# Patient Record
Sex: FEMALE | URBAN - METROPOLITAN AREA
[De-identification: names, ages, dates, MRNs, and addresses within clinical notes are randomized per-mention and may not be internally consistent; named-entity substitution may affect disease eponyms.]

---

## 2024-07-03 ENCOUNTER — APPOINTMENT (OUTPATIENT)
Dept: PRIMARY CARE | Facility: CLINIC | Age: 24
End: 2024-07-03
Payer: COMMERCIAL

## 2024-07-03 VITALS
HEART RATE: 87 BPM | OXYGEN SATURATION: 99 % | BODY MASS INDEX: 30.69 KG/M2 | HEIGHT: 69 IN | SYSTOLIC BLOOD PRESSURE: 122 MMHG | DIASTOLIC BLOOD PRESSURE: 84 MMHG | WEIGHT: 207.2 LBS

## 2024-07-03 DIAGNOSIS — Z00.00 ROUTINE PHYSICAL EXAMINATION: Primary | ICD-10-CM

## 2024-07-03 DIAGNOSIS — E55.9 VITAMIN D DEFICIENCY: ICD-10-CM

## 2024-07-03 LAB
25(OH)D3 SERPL-MCNC: 15 NG/ML (ref 30–100)
ALBUMIN SERPL BCP-MCNC: 4.1 G/DL (ref 3.4–5)
ALP SERPL-CCNC: 53 U/L (ref 33–110)
ALT SERPL W P-5'-P-CCNC: 21 U/L (ref 7–45)
ANION GAP SERPL CALC-SCNC: 14 MMOL/L (ref 10–20)
AST SERPL W P-5'-P-CCNC: 17 U/L (ref 9–39)
BASOPHILS # BLD AUTO: 0.07 X10*3/UL (ref 0–0.1)
BASOPHILS NFR BLD AUTO: 1.3 %
BILIRUB SERPL-MCNC: 0.3 MG/DL (ref 0–1.2)
BUN SERPL-MCNC: 13 MG/DL (ref 6–23)
CALCIUM SERPL-MCNC: 10.1 MG/DL (ref 8.6–10.6)
CHLORIDE SERPL-SCNC: 104 MMOL/L (ref 98–107)
CHOLEST SERPL-MCNC: 212 MG/DL (ref 0–199)
CHOLESTEROL/HDL RATIO: 2.9
CO2 SERPL-SCNC: 23 MMOL/L (ref 21–32)
CREAT SERPL-MCNC: 0.7 MG/DL (ref 0.5–1.05)
EGFRCR SERPLBLD CKD-EPI 2021: >90 ML/MIN/1.73M*2
EOSINOPHIL # BLD AUTO: 0.15 X10*3/UL (ref 0–0.7)
EOSINOPHIL NFR BLD AUTO: 2.9 %
ERYTHROCYTE [DISTWIDTH] IN BLOOD BY AUTOMATED COUNT: 13.2 % (ref 11.5–14.5)
GLUCOSE SERPL-MCNC: 108 MG/DL (ref 74–99)
HCT VFR BLD AUTO: 44 % (ref 36–46)
HDLC SERPL-MCNC: 72.5 MG/DL
HGB BLD-MCNC: 13.9 G/DL (ref 12–16)
IMM GRANULOCYTES # BLD AUTO: 0.01 X10*3/UL (ref 0–0.7)
IMM GRANULOCYTES NFR BLD AUTO: 0.2 % (ref 0–0.9)
LDLC SERPL CALC-MCNC: 108 MG/DL
LYMPHOCYTES # BLD AUTO: 2.86 X10*3/UL (ref 1.2–4.8)
LYMPHOCYTES NFR BLD AUTO: 54.4 %
MCH RBC QN AUTO: 26.9 PG (ref 26–34)
MCHC RBC AUTO-ENTMCNC: 31.6 G/DL (ref 32–36)
MCV RBC AUTO: 85 FL (ref 80–100)
MONOCYTES # BLD AUTO: 0.37 X10*3/UL (ref 0.1–1)
MONOCYTES NFR BLD AUTO: 7 %
NEUTROPHILS # BLD AUTO: 1.8 X10*3/UL (ref 1.2–7.7)
NEUTROPHILS NFR BLD AUTO: 34.2 %
NON HDL CHOLESTEROL: 140 MG/DL (ref 0–149)
NRBC BLD-RTO: 0 /100 WBCS (ref 0–0)
PLATELET # BLD AUTO: 396 X10*3/UL (ref 150–450)
POTASSIUM SERPL-SCNC: 4.4 MMOL/L (ref 3.5–5.3)
PROT SERPL-MCNC: 7.3 G/DL (ref 6.4–8.2)
RBC # BLD AUTO: 5.16 X10*6/UL (ref 4–5.2)
SODIUM SERPL-SCNC: 137 MMOL/L (ref 136–145)
TRIGL SERPL-MCNC: 156 MG/DL (ref 0–149)
TSH SERPL-ACNC: 1.36 MIU/L (ref 0.44–3.98)
VLDL: 31 MG/DL (ref 0–40)
WBC # BLD AUTO: 5.3 X10*3/UL (ref 4.4–11.3)

## 2024-07-03 PROCEDURE — 80053 COMPREHEN METABOLIC PANEL: CPT

## 2024-07-03 PROCEDURE — 80061 LIPID PANEL: CPT

## 2024-07-03 PROCEDURE — 84443 ASSAY THYROID STIM HORMONE: CPT

## 2024-07-03 PROCEDURE — 82306 VITAMIN D 25 HYDROXY: CPT

## 2024-07-03 PROCEDURE — 36415 COLL VENOUS BLD VENIPUNCTURE: CPT

## 2024-07-03 PROCEDURE — 99385 PREV VISIT NEW AGE 18-39: CPT | Performed by: FAMILY MEDICINE

## 2024-07-03 PROCEDURE — 85025 COMPLETE CBC W/AUTO DIFF WBC: CPT

## 2024-07-03 PROCEDURE — 1036F TOBACCO NON-USER: CPT | Performed by: FAMILY MEDICINE

## 2024-07-03 RX ORDER — DROSPIRENONE AND ETHINYL ESTRADIOL 0.03MG-3MG
1 KIT ORAL DAILY
COMMUNITY
Start: 2024-06-03

## 2024-07-03 ASSESSMENT — PATIENT HEALTH QUESTIONNAIRE - PHQ9
SUM OF ALL RESPONSES TO PHQ9 QUESTIONS 1 AND 2: 0
7. TROUBLE CONCENTRATING ON THINGS, SUCH AS READING THE NEWSPAPER OR WATCHING TELEVISION: NOT AT ALL
9. THOUGHTS THAT YOU WOULD BE BETTER OFF DEAD, OR OF HURTING YOURSELF: NOT AT ALL
SUM OF ALL RESPONSES TO PHQ QUESTIONS 1-9: 0
8. MOVING OR SPEAKING SO SLOWLY THAT OTHER PEOPLE COULD HAVE NOTICED. OR THE OPPOSITE, BEING SO FIGETY OR RESTLESS THAT YOU HAVE BEEN MOVING AROUND A LOT MORE THAN USUAL: NOT AT ALL
4. FEELING TIRED OR HAVING LITTLE ENERGY: NOT AT ALL
5. POOR APPETITE OR OVEREATING: NOT AT ALL
6. FEELING BAD ABOUT YOURSELF - OR THAT YOU ARE A FAILURE OR HAVE LET YOURSELF OR YOUR FAMILY DOWN: NOT AT ALL
2. FEELING DOWN, DEPRESSED OR HOPELESS: NOT AT ALL
1. LITTLE INTEREST OR PLEASURE IN DOING THINGS: NOT AT ALL
10. IF YOU CHECKED OFF ANY PROBLEMS, HOW DIFFICULT HAVE THESE PROBLEMS MADE IT FOR YOU TO DO YOUR WORK, TAKE CARE OF THINGS AT HOME, OR GET ALONG WITH OTHER PEOPLE: NOT DIFFICULT AT ALL
3. TROUBLE FALLING OR STAYING ASLEEP OR SLEEPING TOO MUCH: NOT AT ALL

## 2024-07-03 NOTE — PROGRESS NOTES
Patient ID: Ammy Spain [id number] is a 24 y.o. female who presents for Establish Care (New patient).      Pt is here to establish care and Physical  Diet - Trying to watch her Diet  Exercise - Cardio 3 times a week  Medication - On OCP for 6 years    Now having regular periods.  LMP few weeks ago   Supplements - none  Denies smoking/ alcohol or drug use.   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       Immunization History   Administered Date(s) Administered    DTaP, Unspecified 2001, 2001, 2001, 12/12/2002, 06/06/2006    Flu vaccine (IIV4), preservative free *Check age/dose* 10/14/2015, 09/29/2017, 10/22/2018, 10/03/2020, 11/24/2023    HPV, Quadrivalent 08/12/2014, 10/15/2014, 02/23/2015    Hep A, Unspecified 06/06/2006, 06/15/2007    Hepatitis B vaccine, 19 yrs and under (RECOMBIVAX, ENGERIX) 2001, 2001, 2001    HiB, unspecified 2001, 2001, 2001, 10/07/2002    Influenza Whole 11/28/2007    Influenza, Unspecified 11/04/2013    Influenza, live, intranasal 10/22/2008, 11/04/2013    Influenza, live, intranasal, quadrivalent 11/05/2014    Influenza, seasonal, injectable 01/05/2013    MMR vaccine, subcutaneous (MMR II) 06/13/2002, 07/02/2005    Meningococcal ACWY-D (Menactra) 4-valent conjugate vaccine 06/15/2012, 08/07/2017    Meningococcal B vaccine (BEXSERO) 08/20/2018, 09/26/2018    Novel Influenza-H1N1-09, nasal 11/19/2009, 01/26/2010    Pfizer Purple Cap SARS-CoV-2 05/03/2021, 05/24/2021    Pneumococcal Conjugate PCV 7 2001    Pneumococcal Conjugate, Unspecified 2001, 2001, 03/11/2002, 10/07/2002    Polio, Unspecified 2001, 2001, 12/12/2002, 06/06/2006    Tdap vaccine, age 7 year and older (BOOSTRIX, ADACEL) 06/15/2012, 06/17/2024    Varicella vaccine, subcutaneous (VARIVAX) 06/13/2002, 12/21/2007       No Known  "Allergies    Current Outpatient Medications   Medication Sig Dispense Refill    drospirenone-ethinyl estradioL (Amarilys, Ocella) 3-0.03 mg tablet Take 1 tablet by mouth once daily.       No current facility-administered medications for this visit.       History reviewed. No pertinent past medical history.  Social History     Tobacco Use    Smoking status: Never    Smokeless tobacco: Never   Vaping Use    Vaping status: Never Used   Substance Use Topics    Alcohol use: Never    Drug use: Never     Family History   Problem Relation Name Age of Onset    Arthritis Father Mat Frazier     Diabetes Father Mat Frazier        /84   Pulse 87   Ht 1.753 m (5' 9\")   Wt 94 kg (207 lb 3.2 oz)   SpO2 99%   BMI 30.60 kg/m²   Review of System  Constitutional:  Negative for appetite change, chills, fatigue, fever and unexpected weight change.   HENT:  Negative for hearing loss and voice change.    Eyes:  Negative for visual disturbance.   Respiratory:  Negative for cough, chest tightness, shortness of breath and wheezing.    Cardiovascular:  Negative for chest pain and leg swelling.   Gastrointestinal:  Negative for abdominal pain, blood in stool, constipation and diarrhea.   Genitourinary:  Negative for difficulty urinating and dysuria.   Musculoskeletal:  Negative for arthralgias and joint swelling.   Skin:  Negative for rash.   Neurological:  Negative for dizziness, weakness, numbness and headaches.   Psychiatric/Behavioral:  Negative for behavioral problems and confusion.      /84   Pulse 87   Ht 1.753 m (5' 9\")   Wt 94 kg (207 lb 3.2 oz)   SpO2 99%   BMI 30.60 kg/m²     General Appearance:    Alert, cooperative, no distress, appears stated age   Head:    Normocephalic, without obvious abnormality, atraumatic   Eyes:    PERRL, conjunctiva/corneas clear, EOM's intact,    Neck:   Supple, symmetrical, No enlargement   Back:     Symmetric, no curvature, ROM normal, no CVA tenderness   Lungs:     Clear " to auscultation bilaterally, respirations normal ,no wheezing or ronchi   Chest Wall:    No tenderness or deformity   Abdomen:     Soft, non-tender, bowel sounds active all four quadrants, no masses,   no organomegaly   Extremities:   Extremities normal, atraumatic, no cyanosis or edema   Pulses:   2+ and symmetric all extremities   Skin:   Skin color, texture, turgor normal, no rashes or lesions   Lymph nodes:   Cervical nodes normal   Neurologic:    normal strength, sensation and reflexes.      No visits with results within 3 Month(s) from this visit.   Latest known visit with results is:   No results found for any previous visit.       Assessment/Plan   Diagnoses and all orders for this visit:  Routine physical examination  -     TSH with reflex to Free T4 if abnormal; Future  -     Lipid Panel; Future  -     Comprehensive Metabolic Panel; Future  -     CBC and Auto Differential; Future  Vitamin D deficiency  -     Vitamin D 25-Hydroxy,Total (for eval of Vitamin D levels); Future    Patient was advised to follow healthy diet and do continue daily exercise  Will get complete blood work  Patient up-to-date with flu and Tdap

## 2024-07-08 ENCOUNTER — TELEPHONE (OUTPATIENT)
Dept: PRIMARY CARE | Facility: CLINIC | Age: 24
End: 2024-07-08
Payer: COMMERCIAL

## 2024-07-08 DIAGNOSIS — E55.9 VITAMIN D DEFICIENCY: ICD-10-CM

## 2024-07-08 NOTE — TELEPHONE ENCOUNTER
Pt called to put their pharmacy on file, and also stated they needed an rx for vitamin D, thank you.

## 2024-07-08 NOTE — TELEPHONE ENCOUNTER
----- Message from Dora Tsai MD sent at 7/5/2024  4:20 PM EDT -----  Low Vitamin D - Pt should take Ergocalciferol 13983 [employer] Unit once a week for 8 weeks.  Followed By Vitamin D3 2000 IU  Daily.

## 2024-07-09 DIAGNOSIS — E55.9 VITAMIN D DEFICIENCY: ICD-10-CM

## 2024-07-09 RX ORDER — ERGOCALCIFEROL 1.25 MG/1
50000 CAPSULE ORAL
Qty: 8 CAPSULE | Refills: 0 | Status: SHIPPED | OUTPATIENT
Start: 2024-07-14 | End: 2024-09-08

## 2024-07-17 RX ORDER — ERGOCALCIFEROL 1.25 MG/1
50000 CAPSULE ORAL
Qty: 8 CAPSULE | Refills: 0 | Status: SHIPPED | OUTPATIENT
Start: 2024-07-21 | End: 2024-09-15

## 2024-10-24 ENCOUNTER — APPOINTMENT (OUTPATIENT)
Dept: DERMATOLOGY | Facility: CLINIC | Age: 24
End: 2024-10-24
Payer: COMMERCIAL

## 2024-10-28 ENCOUNTER — TELEPHONE (OUTPATIENT)
Dept: PRIMARY CARE | Facility: CLINIC | Age: 24
End: 2024-10-28
Payer: COMMERCIAL

## 2024-10-28 DIAGNOSIS — E55.9 VITAMIN D DEFICIENCY: Primary | ICD-10-CM

## 2024-10-30 RX ORDER — ERGOCALCIFEROL 1.25 MG/1
50000 CAPSULE ORAL WEEKLY
Qty: 8 CAPSULE | Refills: 0 | Status: SHIPPED | OUTPATIENT
Start: 2024-10-30 | End: 2024-12-25

## 2024-11-05 ENCOUNTER — TELEPHONE (OUTPATIENT)
Dept: PRIMARY CARE | Facility: CLINIC | Age: 24
End: 2024-11-05
Payer: COMMERCIAL

## 2024-11-05 DIAGNOSIS — E55.9 VITAMIN D DEFICIENCY: ICD-10-CM

## 2024-11-06 RX ORDER — ERGOCALCIFEROL 1.25 MG/1
50000 CAPSULE ORAL WEEKLY
Qty: 8 CAPSULE | Refills: 0 | Status: SHIPPED | OUTPATIENT
Start: 2024-11-06 | End: 2025-01-01

## 2024-12-19 ENCOUNTER — TELEPHONE (OUTPATIENT)
Dept: DERMATOLOGY | Facility: CLINIC | Age: 24
End: 2024-12-19

## 2024-12-19 ENCOUNTER — TELEMEDICINE (OUTPATIENT)
Dept: DERMATOLOGY | Facility: CLINIC | Age: 24
End: 2024-12-19
Payer: COMMERCIAL

## 2024-12-19 DIAGNOSIS — L70.0 ACNE VULGARIS: Primary | ICD-10-CM

## 2024-12-19 PROCEDURE — 99214 OFFICE O/P EST MOD 30 MIN: CPT | Performed by: DERMATOLOGY

## 2024-12-19 RX ORDER — BENZOYL PEROXIDE 50 MG/ML
1 LIQUID TOPICAL DAILY
Qty: 227 G | Refills: 11 | Status: SHIPPED | OUTPATIENT
Start: 2024-12-19 | End: 2025-12-19

## 2024-12-19 RX ORDER — CLINDAMYCIN PHOSPHATE 10 UG/ML
LOTION TOPICAL DAILY
Qty: 60 ML | Refills: 11 | Status: SHIPPED | OUTPATIENT
Start: 2024-12-19 | End: 2025-12-19

## 2024-12-19 RX ORDER — SPIRONOLACTONE 25 MG/1
TABLET ORAL
Qty: 360 TABLET | Refills: 1 | Status: SHIPPED | OUTPATIENT
Start: 2024-12-19

## 2024-12-19 RX ORDER — TRETINOIN 1 MG/G
CREAM TOPICAL
Qty: 20 G | Refills: 11 | Status: SHIPPED | OUTPATIENT
Start: 2024-12-19

## 2024-12-19 ASSESSMENT — DERMATOLOGY QUALITY OF LIFE (QOL) ASSESSMENT
RATE HOW BOTHERED YOU ARE BY EFFECTS OF YOUR SKIN PROBLEMS ON YOUR ACTIVITIES (EG, GOING OUT, ACCOMPLISHING WHAT YOU WANT, WORK ACTIVITIES OR YOUR RELATIONSHIPS WITH OTHERS): 0 - NEVER BOTHERED
RATE HOW EMOTIONALLY BOTHERED YOU ARE BY YOUR SKIN PROBLEM (FOR EXAMPLE, WORRY, EMBARRASSMENT, FRUSTRATION): 4
RATE HOW BOTHERED YOU ARE BY SYMPTOMS OF YOUR SKIN PROBLEM (EG, ITCHING, STINGING BURNING, HURTING OR SKIN IRRITATION): 0 - NEVER BOTHERED
RATE HOW EMOTIONALLY BOTHERED YOU ARE BY YOUR SKIN PROBLEM (FOR EXAMPLE, WORRY, EMBARRASSMENT, FRUSTRATION): 4
WHAT SINGLE SKIN CONDITION LISTED BELOW IS THE PATIENT ANSWERING THE QUALITY-OF-LIFE ASSESSMENT QUESTIONS ABOUT: ACNE
RATE HOW BOTHERED YOU ARE BY SYMPTOMS OF YOUR SKIN PROBLEM (EG, ITCHING, STINGING BURNING, HURTING OR SKIN IRRITATION): 0 - NEVER BOTHERED
WHAT SINGLE SKIN CONDITION LISTED BELOW IS THE PATIENT ANSWERING THE QUALITY-OF-LIFE ASSESSMENT QUESTIONS ABOUT: ACNE
RATE HOW BOTHERED YOU ARE BY EFFECTS OF YOUR SKIN PROBLEMS ON YOUR ACTIVITIES (EG, GOING OUT, ACCOMPLISHING WHAT YOU WANT, WORK ACTIVITIES OR YOUR RELATIONSHIPS WITH OTHERS): 0 - NEVER BOTHERED

## 2024-12-19 NOTE — PROGRESS NOTES
"Subjective     Ammy Frazier is a 24 y.o. female who presents for the following: Acne.     Here for acne follow up.  Last seen by Dr. Rinaldi on 6-7-2023.  At that time she was well controlled on clindamycin lotion, tretinoin 0.1%, and BPO 4% wash and was continued on these.    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  Notices an association with her period and when she gets breakouts.    Review of Systems:  No other skin or systemic complaints other than what is documented elsewhere in the note.    The following portions of the chart were reviewed this encounter and updated as appropriate:          Skin Cancer History  No skin cancer on file.      Specialty Problems    None       Objective   Well appearing patient in no apparent distress; mood and affect are within normal limits.    A focused skin examination was performed. All findings within normal limits unless otherwise noted below.    Assessment/Plan   1. Acne vulgaris  Head - Anterior (Face)  Scattered comedones and inflammatory papulopustules.    - CONTINUE BPO 5% wash  - CONTINUE clindamycin 1% lotion  - CONTINUE tretinoin 0.1%  - START spironolactone 100mg (50 mg x 2 weeks, then 75mg x 2 weeks); she was on this a few years ago and had a good response to this dose    spironolactone (Aldactone) 25 mg tablet - Head - Anterior (Face)  Take 50 mg (2 tabs) for 2 weeks, then 75 mg (3 tabs) for 2 weeks, then full dose of 100 mg (4 tabs).    clindamycin (Cleocin T) 1 % lotion - Head - Anterior (Face)  Apply topically once daily. 60g    tretinoin (Retin-A) 0.1 % cream - Head - Anterior (Face)  Apply a thin layer to affected area at bedtime as tolerated.    benzoyl peroxide 5 % external wash - Head - Anterior (Face)  Apply 1 Application topically once daily. Dispense 240 mL    Related Procedures  Follow Up In Dermatology - Established Patient    Follow up in 3 months    Augustine Ghosh MD  PGY-2, Dermatology    I saw " and evaluated the patient. I personally obtained the key and critical portions of the history and physical exam or was physically present for key and critical portions performed by the resident/fellow. I reviewed the resident/fellow's documentation and discussed the patient with the resident/fellow. I agree with the resident/fellow's medical decision making as documented in the note.    Kyle Guzman MD PhD

## 2024-12-26 ENCOUNTER — TELEPHONE (OUTPATIENT)
Dept: DERMATOLOGY | Facility: CLINIC | Age: 24
End: 2024-12-26
Payer: COMMERCIAL

## 2024-12-26 NOTE — TELEPHONE ENCOUNTER
Austin Gimenez,    We have scheduled your next virtual appointment with Dr. Guzman for 3/20/2025 at 4pm.  If this date and time are okay, you don't need to do anything.  If the appointment is inconvenient, please contact the office at [phone].    Sincerely,    Patient Access Team   Dept of Dermatology

## 2025-01-09 DIAGNOSIS — E55.9 VITAMIN D DEFICIENCY: ICD-10-CM

## 2025-01-09 RX ORDER — ACETAMINOPHEN 500 MG
2000 TABLET ORAL DAILY
Qty: 90 CAPSULE | Refills: 1 | Status: SHIPPED | OUTPATIENT
Start: 2025-01-09

## 2025-01-14 ENCOUNTER — TELEPHONE (OUTPATIENT)
Dept: PRIMARY CARE | Facility: CLINIC | Age: 25
End: 2025-01-14
Payer: COMMERCIAL

## 2025-01-14 DIAGNOSIS — E55.9 VITAMIN D DEFICIENCY: ICD-10-CM

## 2025-01-14 RX ORDER — ACETAMINOPHEN 500 MG
2000 TABLET ORAL DAILY
Qty: 90 CAPSULE | Refills: 1 | Status: SHIPPED | OUTPATIENT
Start: 2025-01-14

## 2025-03-20 ENCOUNTER — APPOINTMENT (OUTPATIENT)
Dept: DERMATOLOGY | Facility: CLINIC | Age: 25
End: 2025-03-20
Payer: COMMERCIAL

## 2025-03-20 DIAGNOSIS — L70.0 ACNE VULGARIS: ICD-10-CM

## 2025-03-20 PROCEDURE — 99213 OFFICE O/P EST LOW 20 MIN: CPT | Performed by: DERMATOLOGY

## 2025-03-20 RX ORDER — SPIRONOLACTONE 25 MG/1
125 TABLET ORAL DAILY
Qty: 150 TABLET | Refills: 3 | Status: SHIPPED | OUTPATIENT
Start: 2025-03-20 | End: 2026-03-20

## 2025-03-20 NOTE — PROGRESS NOTES
Subjective     Ammy Frazier is a 24 y.o. female who presents for the following: Acne.     Acne patient on BPO 5% wash, clindamycin 1% lotion, and tretinoin 0.1% cream; at [facility] 12/[facility]24 started on spirolactone with a goal dose of 100 mg PO daily    Thinks she is improved on the spironolactone, less on her T zone. No dizziness, no lightheadedness, no changes to period with the spironolactone. Still notes a few bumps that are kya red on her cheeks. Unsure if they are acne or pores.       Review of Systems:  No other skin or systemic complaints other than what is documented elsewhere in the note.    The following portions of the chart were reviewed this encounter and updated as appropriate:          Skin Cancer History  No skin cancer on file.      Specialty Problems    None       Objective   Well appearing patient in no apparent distress; mood and affect are within normal limits.    A focused skin examination was performed. All findings within normal limits unless otherwise noted below.    Assessment/Plan   1. Acne vulgaris  Head - Anterior (Face)  Few scattered comedones and inflammatory papulopustules.    - improving on topicals and spironolactone  - CONTINUE BPO 5% wash  - CONTINUE clindamycin 1% lotion  - CONTINUE tretinoin 0.1%  - given weight of 94 kg and that patient tolerated 100 mg spironolactone with no side effects, will increase spironolactone to 125 mg PO daily. Instructed to decrease back to 100 daily if experiences any dizziness or lightheadedness    spironolactone (Aldactone) 25 mg tablet - Head - Anterior (Face)  Take 5 tablets (125 mg) by mouth once daily. If become dizziness/light headed decrease back to 100 mg daily    Related Procedures  Follow Up In Dermatology - Established Patient  Follow Up In Dermatology    Related Medications  clindamycin (Cleocin T) 1 % lotion  Apply topically once daily. 60g    tretinoin (Retin-A) 0.1 % cream  Apply a thin layer to affected area at bedtime as  tolerated.    benzoyl peroxide 5 % external wash  Apply 1 Application topically once daily. Dispense 240 mL    Plan for 3 mo FUV for acne    Seen and discussed with attending physician Dr. Thomas Gilmore MD, PhD  Resident, Dermatology     I saw and evaluated the patient. I personally obtained the key and critical portions of the history and physical exam or was physically present for key and critical portions performed by the resident/fellow. I reviewed the resident/fellow's documentation and discussed the patient with the resident/fellow. I agree with the resident/fellow's medical decision making as documented in the note.    Kyle Guzman MD PhD

## 2025-07-12 DIAGNOSIS — L70.0 ACNE VULGARIS: ICD-10-CM

## 2025-07-15 RX ORDER — SPIRONOLACTONE 25 MG/1
TABLET ORAL
Qty: 120 TABLET | Refills: 5 | Status: SHIPPED | OUTPATIENT
Start: 2025-07-15